# Patient Record
Sex: MALE | URBAN - METROPOLITAN AREA
[De-identification: names, ages, dates, MRNs, and addresses within clinical notes are randomized per-mention and may not be internally consistent; named-entity substitution may affect disease eponyms.]

---

## 2023-09-12 ENCOUNTER — APPOINTMENT (OUTPATIENT)
Dept: URBAN - METROPOLITAN AREA CLINIC 253 | Age: 48
Setting detail: DERMATOLOGY
End: 2023-09-13

## 2023-09-12 VITALS — HEIGHT: 63 IN | RESPIRATION RATE: 14 BRPM | WEIGHT: 150 LBS

## 2023-09-12 DIAGNOSIS — L30.8 OTHER SPECIFIED DERMATITIS: ICD-10-CM

## 2023-09-12 PROCEDURE — OTHER PRESCRIPTION: OTHER

## 2023-09-12 PROCEDURE — OTHER ADDITIONAL NOTES: OTHER

## 2023-09-12 PROCEDURE — 99204 OFFICE O/P NEW MOD 45 MIN: CPT

## 2023-09-12 PROCEDURE — OTHER MIPS QUALITY: OTHER

## 2023-09-12 PROCEDURE — OTHER COUNSELING: OTHER

## 2023-09-12 RX ORDER — BETAMETHASONE DIPROPIONATE 0.5 MG/G
OINTMENT TOPICAL BID
Qty: 45 | Refills: 2 | Status: ERX | COMMUNITY
Start: 2023-09-12

## 2023-09-12 ASSESSMENT — LOCATION SIMPLE DESCRIPTION DERM
LOCATION SIMPLE: LEFT HAND
LOCATION SIMPLE: RIGHT HAND

## 2023-09-12 ASSESSMENT — LOCATION ZONE DERM: LOCATION ZONE: HAND

## 2023-09-12 ASSESSMENT — LOCATION DETAILED DESCRIPTION DERM
LOCATION DETAILED: LEFT RADIAL DORSAL HAND
LOCATION DETAILED: RIGHT RADIAL DORSAL HAND

## 2023-09-12 ASSESSMENT — ITCH NUMERIC RATING SCALE: HOW SEVERE IS YOUR ITCHING?: 6

## 2023-09-12 NOTE — HPI: RASH (ECZEMA)
How Severe Is Your Eczema?: moderate
Is This A New Presentation, Or A Follow-Up?: Rash
Additional History: Every winter he deals with dry skin. This past winter was horrible with the dry skin but also the eczema has been a major arceo. He is struggling to keep hands moist and deal with the eczema. He describes his hands as dry, itchy, and cracked. He is worried about going into winter with his hands still being in rough shape. He has tried a lot of different products including Maximum strength lotions and eczema lotions balms and creams, which provided temporary relief. His hands are dried out constantly with washing his hands. He has red, inflamed, dry patches on his hands that are itchy. He has in the past had where the eczema spread up his arm but normally it stays on the hands. He is worried about possible infections with his hands cracking open.

## 2023-09-12 NOTE — PROCEDURE: ADDITIONAL NOTES
Additional Notes: Discussed patient using super glue to close the severe cracks on hand to avoid further trauma to the area or infection. \\nRecommend patient use the betamethasone ointment until condition resolves along with an extra day or two to ensure condition resolves.\\nRecommended Dove sensitive skin hand soap.
Render Risk Assessment In Note?: no
Detail Level: Simple

## 2023-10-13 ENCOUNTER — APPOINTMENT (OUTPATIENT)
Dept: URBAN - METROPOLITAN AREA CLINIC 253 | Age: 48
Setting detail: DERMATOLOGY
End: 2023-10-16

## 2023-10-13 VITALS — WEIGHT: 160 LBS | HEIGHT: 64 IN | RESPIRATION RATE: 14 BRPM

## 2023-10-13 DIAGNOSIS — L30.8 OTHER SPECIFIED DERMATITIS: ICD-10-CM

## 2023-10-13 PROCEDURE — OTHER MIPS QUALITY: OTHER

## 2023-10-13 PROCEDURE — 99214 OFFICE O/P EST MOD 30 MIN: CPT

## 2023-10-13 PROCEDURE — OTHER PRESCRIPTION: OTHER

## 2023-10-13 PROCEDURE — OTHER ADDITIONAL NOTES: OTHER

## 2023-10-13 PROCEDURE — OTHER COUNSELING: OTHER

## 2023-10-13 RX ORDER — UREA 40 G/100G
40% CREAM TOPICAL QD
Qty: 198.4 | Refills: 2 | Status: ERX | COMMUNITY
Start: 2023-10-13

## 2023-10-13 ASSESSMENT — LOCATION SIMPLE DESCRIPTION DERM
LOCATION SIMPLE: LEFT HAND
LOCATION SIMPLE: RIGHT HAND

## 2023-10-13 ASSESSMENT — LOCATION DETAILED DESCRIPTION DERM
LOCATION DETAILED: LEFT RADIAL DORSAL HAND
LOCATION DETAILED: RIGHT RADIAL DORSAL HAND

## 2023-10-13 ASSESSMENT — LOCATION ZONE DERM: LOCATION ZONE: HAND

## 2023-10-13 NOTE — PROCEDURE: ADDITIONAL NOTES
Additional Notes: Flare has been well controlled with betamethasone though he is still dealing with dry skin and fissures on the hands. \\nDiscussed patient using super glue to close the fissures on hands to avoid further trauma to the area or infection. \\nI will start him on urea cream nightly for management, RX sent to pharmacy though also discussed that he may obtain this over the counter if not affordable.
Render Risk Assessment In Note?: no
Detail Level: Simple